# Patient Record
Sex: MALE | Race: WHITE | URBAN - METROPOLITAN AREA
[De-identification: names, ages, dates, MRNs, and addresses within clinical notes are randomized per-mention and may not be internally consistent; named-entity substitution may affect disease eponyms.]

---

## 2022-03-03 ENCOUNTER — NEW PATIENT (OUTPATIENT)
Dept: URBAN - METROPOLITAN AREA CLINIC 1 | Facility: CLINIC | Age: 42
End: 2022-03-03

## 2022-03-03 DIAGNOSIS — H16.002: ICD-10-CM

## 2022-03-03 DIAGNOSIS — H01.021: ICD-10-CM

## 2022-03-03 DIAGNOSIS — H01.024: ICD-10-CM

## 2022-03-03 PROCEDURE — 92002 INTRM OPH EXAM NEW PATIENT: CPT

## 2022-03-03 RX ORDER — TOBRAMYCIN / DEXAMETHASONE 3; .5 MG/ML; MG/ML: 1 SUSPENSION/ DROPS OPHTHALMIC

## 2022-03-03 ASSESSMENT — TONOMETRY: OD_IOP_MMHG: 15

## 2022-03-03 ASSESSMENT — VISUAL ACUITY
OS_SC: 20/20
OD_SC: 20/20

## 2022-03-03 NOTE — PATIENT DISCUSSION
Discussed lid hygiene, warm compress and eyelid wash/baby shampoo or ocusoft lid scrubs. Sample lid scrubs given.

## 2022-03-10 ENCOUNTER — FOLLOW UP (OUTPATIENT)
Dept: URBAN - METROPOLITAN AREA CLINIC 1 | Facility: CLINIC | Age: 42
End: 2022-03-10

## 2022-03-10 DIAGNOSIS — H01.024: ICD-10-CM

## 2022-03-10 DIAGNOSIS — H16.002: ICD-10-CM

## 2022-03-10 DIAGNOSIS — H01.021: ICD-10-CM

## 2022-03-10 PROCEDURE — 92012 INTRM OPH EXAM EST PATIENT: CPT

## 2022-03-10 ASSESSMENT — VISUAL ACUITY
OD_SC: 20/20
OS_SC: 20/20

## 2022-03-10 ASSESSMENT — TONOMETRY
OD_IOP_MMHG: 15
OS_IOP_MMHG: 15

## 2022-03-10 NOTE — PATIENT DISCUSSION
Resolving, with underlying K Scar. Decrease Tobradex ST TID OS x 1 week, BID x 1 week, Qdaily OS x 1 week, then d/c.

## 2022-03-10 NOTE — PATIENT DISCUSSION
Continue lid hygiene, warm compress and eyelid wash/baby shampoo or ocusoft lid scrubs. Sample lid scrubs given.

## 2022-04-19 ENCOUNTER — FOLLOW UP (OUTPATIENT)
Dept: URBAN - METROPOLITAN AREA CLINIC 1 | Facility: CLINIC | Age: 42
End: 2022-04-19

## 2022-04-19 DIAGNOSIS — H01.024: ICD-10-CM

## 2022-04-19 DIAGNOSIS — H01.021: ICD-10-CM

## 2022-04-19 DIAGNOSIS — H17.822: ICD-10-CM

## 2022-04-19 PROCEDURE — 92014 COMPRE OPH EXAM EST PT 1/>: CPT

## 2022-04-19 ASSESSMENT — TONOMETRY
OD_IOP_MMHG: 16
OS_IOP_MMHG: 15

## 2022-04-19 ASSESSMENT — VISUAL ACUITY
OS_SC: 20/20
OS_SC: J1+
OD_SC: J1+
OD_SC: 20/20